# Patient Record
Sex: FEMALE
[De-identification: names, ages, dates, MRNs, and addresses within clinical notes are randomized per-mention and may not be internally consistent; named-entity substitution may affect disease eponyms.]

---

## 2020-09-28 ENCOUNTER — NEW PATIENT (OUTPATIENT)
Age: 60
End: 2020-09-28

## 2020-09-28 DIAGNOSIS — H02.825: ICD-10-CM

## 2020-09-28 DIAGNOSIS — H43.813: ICD-10-CM

## 2020-09-28 PROCEDURE — 99204 OFFICE O/P NEW MOD 45 MIN: CPT

## 2020-09-28 ASSESSMENT — VISUAL ACUITY
OS_CC: 20/30
OD_CC: 20/40+1

## 2020-09-28 ASSESSMENT — TONOMETRY
OD_IOP_MMHG: 13
OS_IOP_MMHG: 14

## 2020-10-12 ENCOUNTER — CLINIC PROCEDURE ONLY (OUTPATIENT)
Age: 60
End: 2020-10-12

## 2020-10-12 DIAGNOSIS — H02.825: ICD-10-CM

## 2020-10-12 PROCEDURE — 67840 REMOVE EYELID LESION: CPT

## 2021-05-12 ENCOUNTER — OFFICE VISIT (OUTPATIENT)
Dept: URBAN - METROPOLITAN AREA CLINIC 91 | Facility: CLINIC | Age: 61
End: 2021-05-12
Payer: COMMERCIAL

## 2021-05-12 DIAGNOSIS — H02.831 DERMATOCHALASIS OF RIGHT UPPER EYELID: ICD-10-CM

## 2021-05-12 DIAGNOSIS — H02.834 DERMATOCHALASIS OF LEFT UPPER EYELID: ICD-10-CM

## 2021-05-12 DIAGNOSIS — H52.4 PRESBYOPIA: ICD-10-CM

## 2021-05-12 DIAGNOSIS — H35.033 HYPERTENSIVE RETINOPATHY, BILATERAL: Primary | ICD-10-CM

## 2021-05-12 DIAGNOSIS — H43.392 OTHER VITREOUS OPACITIES, LEFT EYE: ICD-10-CM

## 2021-05-12 DIAGNOSIS — H25.13 AGE-RELATED NUCLEAR CATARACT, BILATERAL: ICD-10-CM

## 2021-05-12 PROCEDURE — 99214 OFFICE O/P EST MOD 30 MIN: CPT | Performed by: OPHTHALMOLOGY

## 2021-05-12 ASSESSMENT — VISUAL ACUITY
OD: 20/25
OS: 20/25

## 2021-05-12 ASSESSMENT — INTRAOCULAR PRESSURE
OD: 14
OS: 15

## 2021-05-12 NOTE — IMPRESSION/PLAN
Impression: Other vitreous opacities, left eye: H43.392. Plan: For vitreous floaters, I have explained the potential for developing a retinal tear or detachment. Patient was advised to return ASAP if they notice an increase or change in floaters, a change or decrease of vision or a curtain/veil coming over vision. I explained the risk of vision loss, and possible need for retinal detachment repair in detail. Patient was also provided an informational pamphlet.

## 2025-07-09 ENCOUNTER — OFFICE VISIT (OUTPATIENT)
Facility: LOCATION | Age: 65
End: 2025-07-09
Payer: COMMERCIAL

## 2025-07-09 DIAGNOSIS — H43.813 VITREOUS DEGENERATION, BILATERAL: Primary | ICD-10-CM

## 2025-07-09 DIAGNOSIS — H25.13 AGE-RELATED NUCLEAR CATARACT, BILATERAL: ICD-10-CM

## 2025-07-09 DIAGNOSIS — H11.153 PINGUECULA, BILATERAL: ICD-10-CM

## 2025-07-09 DIAGNOSIS — H35.033 HYPERTENSIVE RETINOPATHY, BILATERAL: ICD-10-CM

## 2025-07-09 PROCEDURE — 99213 OFFICE O/P EST LOW 20 MIN: CPT | Performed by: OPHTHALMOLOGY

## 2025-07-09 ASSESSMENT — INTRAOCULAR PRESSURE
OD: 15
OS: 13

## (undated) RX ORDER — ERYTHROMYCIN 5 MG/G
1/4 OINTMENT OPHTHALMIC TWICE A DAY
End: 2020-10-17